# Patient Record
Sex: MALE | Race: OTHER | HISPANIC OR LATINO | Employment: FULL TIME | URBAN - METROPOLITAN AREA
[De-identification: names, ages, dates, MRNs, and addresses within clinical notes are randomized per-mention and may not be internally consistent; named-entity substitution may affect disease eponyms.]

---

## 2018-12-29 ENCOUNTER — OFFICE VISIT (OUTPATIENT)
Dept: URGENT CARE | Facility: CLINIC | Age: 48
End: 2018-12-29
Payer: COMMERCIAL

## 2018-12-29 VITALS
HEIGHT: 66 IN | RESPIRATION RATE: 16 BRPM | BODY MASS INDEX: 35.36 KG/M2 | TEMPERATURE: 97.7 F | SYSTOLIC BLOOD PRESSURE: 120 MMHG | WEIGHT: 220 LBS | OXYGEN SATURATION: 98 % | DIASTOLIC BLOOD PRESSURE: 72 MMHG | HEART RATE: 74 BPM

## 2018-12-29 DIAGNOSIS — H10.89 OTHER CONJUNCTIVITIS OF LEFT EYE: ICD-10-CM

## 2018-12-29 DIAGNOSIS — J40 BRONCHITIS: ICD-10-CM

## 2018-12-29 DIAGNOSIS — R09.81 SINUS CONGESTION: ICD-10-CM

## 2018-12-29 PROCEDURE — 99203 OFFICE O/P NEW LOW 30 MIN: CPT | Performed by: PHYSICIAN ASSISTANT

## 2018-12-29 RX ORDER — OFLOXACIN 3 MG/ML
SOLUTION/ DROPS OPHTHALMIC
Qty: 5 ML | Refills: 0 | Status: SHIPPED | OUTPATIENT
Start: 2018-12-29 | End: 2023-05-09

## 2018-12-29 RX ORDER — CEFUROXIME AXETIL 500 MG/1
500 TABLET ORAL 2 TIMES DAILY
Qty: 20 TAB | Refills: 0 | Status: SHIPPED | OUTPATIENT
Start: 2018-12-29 | End: 2019-01-08

## 2018-12-29 ASSESSMENT — ENCOUNTER SYMPTOMS
FEVER: 0
CARDIOVASCULAR NEGATIVE: 1
RHINORRHEA: 1
SORE THROAT: 0
SHORTNESS OF BREATH: 0
COUGH: 1
EYES NEGATIVE: 1
WHEEZING: 0
CONSTITUTIONAL NEGATIVE: 1

## 2018-12-29 NOTE — LETTER
December 29, 2018         Patient: Christian Boo   YOB: 1970   Date of Visit: 12/29/2018           To Whom it May Concern:    Christian Boo was seen in my clinic on 12/29/2018 for illness; please excuse today.    If you have any questions or concerns, please don't hesitate to call.        Sincerely,           Humberto Nogueira P.A.-C.  Electronically Signed

## 2018-12-29 NOTE — PROGRESS NOTES
Subjective:      Christian Boo is a 48 y.o. male who presents with Cough (cough, congestion)            Cough   This is a new problem. The current episode started in the past 7 days. The problem has been unchanged. The problem occurs every few minutes. The cough is productive of sputum. Associated symptoms include nasal congestion and rhinorrhea. Pertinent negatives include no fever, sore throat, shortness of breath or wheezing. Nothing aggravates the symptoms. He has tried nothing for the symptoms. The treatment provided no relief. There is no history of asthma.       Review of Systems   Constitutional: Negative.  Negative for fever.   HENT: Positive for rhinorrhea. Negative for sore throat.    Eyes: Negative.    Respiratory: Positive for cough. Negative for shortness of breath and wheezing.    Cardiovascular: Negative.    Skin: Negative.           Objective:     There were no vitals taken for this visit.     Physical Exam   Constitutional: He is oriented to person, place, and time. He appears well-developed and well-nourished. No distress.   HENT:   Head: Normocephalic and atraumatic.   Mouth/Throat: Oropharynx is clear and moist.   Eyes: Pupils are equal, round, and reactive to light. Conjunctivae and EOM are normal.   Neck: Normal range of motion. Neck supple.   Cardiovascular: Normal rate, regular rhythm and normal heart sounds.    Pulmonary/Chest: Effort normal and breath sounds normal. No respiratory distress. He has no wheezes. He has no rales.   Lymphadenopathy:     He has no cervical adenopathy.   Neurological: He is alert and oriented to person, place, and time.   Skin: Skin is warm and dry.   Psychiatric: He has a normal mood and affect. His behavior is normal.   Nursing note and vitals reviewed.    Active Ambulatory Problems     Diagnosis Date Noted   • No Active Ambulatory Problems     Resolved Ambulatory Problems     Diagnosis Date Noted   • No Resolved Ambulatory Problems     No Additional Past  Medical History     No current outpatient prescriptions on file prior to visit.     No current facility-administered medications on file prior to visit.      Social History     Social History   • Marital status:      Spouse name: N/A   • Number of children: N/A   • Years of education: N/A     Occupational History   • Not on file.     Social History Main Topics   • Smoking status: Never Smoker   • Smokeless tobacco: Never Used   • Alcohol use Not on file   • Drug use: Unknown   • Sexual activity: Not on file     Other Topics Concern   • Not on file     Social History Narrative   • No narrative on file     History reviewed. No pertinent family history.  Patient has no known allergies.              Assessment/Plan:     ·  bronchitis      · Start w/MucinexD; nsaids; [hold rx for abx prn [conditional use] if sx persist/worsen]  ·

## 2019-04-04 ENCOUNTER — OFFICE VISIT (OUTPATIENT)
Dept: URGENT CARE | Facility: CLINIC | Age: 49
End: 2019-04-04
Payer: COMMERCIAL

## 2019-04-04 ENCOUNTER — TELEPHONE (OUTPATIENT)
Dept: URGENT CARE | Facility: CLINIC | Age: 49
End: 2019-04-04

## 2019-04-04 VITALS
WEIGHT: 220 LBS | SYSTOLIC BLOOD PRESSURE: 134 MMHG | TEMPERATURE: 98.9 F | RESPIRATION RATE: 16 BRPM | DIASTOLIC BLOOD PRESSURE: 82 MMHG | HEART RATE: 82 BPM | BODY MASS INDEX: 35.36 KG/M2 | HEIGHT: 66 IN | OXYGEN SATURATION: 98 %

## 2019-04-04 DIAGNOSIS — J06.9 VIRAL UPPER RESPIRATORY ILLNESS: ICD-10-CM

## 2019-04-04 DIAGNOSIS — R05.9 COUGH: ICD-10-CM

## 2019-04-04 LAB
ALBUMIN SERPL-MCNC: 4.3 G/DL (ref 3.5–5.5)
ALBUMIN/GLOB SERPL: 2 {RATIO} (ref 1.2–2.2)
ALP SERPL-CCNC: 82 IU/L (ref 39–117)
ALT SERPL-CCNC: 32 IU/L (ref 0–44)
AST SERPL-CCNC: 24 IU/L (ref 0–40)
BASOPHILS # BLD AUTO: 0 X10E3/UL (ref 0–0.2)
BASOPHILS NFR BLD AUTO: 0 %
BILIRUB SERPL-MCNC: 0.5 MG/DL (ref 0–1.2)
BUN SERPL-MCNC: 19 MG/DL (ref 6–24)
BUN/CREAT SERPL: 21 (ref 9–20)
CALCIUM SERPL-MCNC: 8.9 MG/DL (ref 8.7–10.2)
CHLORIDE SERPL-SCNC: 105 MMOL/L (ref 96–106)
CO2 SERPL-SCNC: 20 MMOL/L (ref 20–29)
CREAT SERPL-MCNC: 0.92 MG/DL (ref 0.76–1.27)
EOSINOPHIL # BLD AUTO: 0.2 X10E3/UL (ref 0–0.4)
EOSINOPHIL NFR BLD AUTO: 2 %
ERYTHROCYTE [DISTWIDTH] IN BLOOD BY AUTOMATED COUNT: 13.5 % (ref 12.3–15.4)
GLOBULIN SER CALC-MCNC: 2.2 G/DL (ref 1.5–4.5)
GLUCOSE SERPL-MCNC: 104 MG/DL (ref 65–99)
HCT VFR BLD AUTO: 45.9 % (ref 37.5–51)
HGB BLD-MCNC: 15.9 G/DL (ref 13–17.7)
IMM GRANULOCYTES # BLD AUTO: NORMAL 10*3/UL
IMM GRANULOCYTES NFR BLD AUTO: NORMAL %
IMMATURE CELLS  115398: NORMAL
LYMPHOCYTES # BLD AUTO: 1.9 X10E3/UL (ref 0.7–3.1)
LYMPHOCYTES NFR BLD AUTO: 21 %
MCH RBC QN AUTO: 30.6 PG (ref 26.6–33)
MCHC RBC AUTO-ENTMCNC: 34.6 G/DL (ref 31.5–35.7)
MCV RBC AUTO: 88 FL (ref 79–97)
MONOCYTES # BLD AUTO: 0.7 X10E3/UL (ref 0.1–0.9)
MONOCYTES NFR BLD AUTO: 8 %
MORPHOLOGY BLD-IMP: NORMAL
NEUTROPHILS # BLD AUTO: 6.1 X10E3/UL (ref 1.4–7)
NEUTROPHILS NFR BLD AUTO: 69 %
NRBC BLD AUTO-RTO: NORMAL %
PLATELET # BLD AUTO: 222 X10E3/UL (ref 150–379)
POTASSIUM SERPL-SCNC: 4.1 MMOL/L (ref 3.5–5.2)
PROT SERPL-MCNC: 6.5 G/DL (ref 6–8.5)
RBC # BLD AUTO: 5.19 X10E6/UL (ref 4.14–5.8)
SODIUM SERPL-SCNC: 139 MMOL/L (ref 134–144)
WBC # BLD AUTO: 8.9 X10E3/UL (ref 3.4–10.8)

## 2019-04-04 PROCEDURE — 99214 OFFICE O/P EST MOD 30 MIN: CPT | Performed by: PHYSICIAN ASSISTANT

## 2019-04-04 ASSESSMENT — ENCOUNTER SYMPTOMS
MYALGIAS: 1
SORE THROAT: 1
FEVER: 1
ABDOMINAL PAIN: 0
EYE DISCHARGE: 0
VOMITING: 0
NAUSEA: 1
EYE REDNESS: 0
HEADACHES: 1
COUGH: 1

## 2019-04-04 NOTE — LETTER
[facility]  [address] [place] [address]     April 4, 2019    Patient: Christian Boo   YOB: 1970   Date of Visit: 4/4/2019       To Whom It May Concern:    Christian Boo was seen and treated in our department on 4/4/2019. Please excuse him from work Today (4/4) and Tomorrow (4/5).    Sincerely,     Margot Shipman

## 2019-04-04 NOTE — PROGRESS NOTES
Subjective:      Christian Boo is a 48 y.o. male who presents with Cough (shortness of breath, cough, fever and light headed, anxiety, stomach upset started over a week now)          Cough   This is a new problem. Episode onset: 1 week ago. The problem has been gradually worsening. The problem occurs hourly. The cough is productive of sputum (patient reports occasional sputum production). Associated symptoms include a fever, headaches, myalgias, nasal congestion, a sore throat and shortness of breath (intermittent). Pertinent negatives include no chest pain, ear pain, eye redness or rash. Exacerbated by: patient reports his cough is exacerbated by smoke from working in the [email or web address]. He has tried nothing for the symptoms.     The patient reports a history of seasonal allergies in the spring.    Patient is also c/o intermittent lightheadedness. He states he is feeling slightly more anxious lately. He also reports intermittent leg cramps, and swelling of his right lower extremity. The patient does not smoke, and denies a family history of blood clots or blood disorders.         PMH:  has no past medical history on file.  MEDS:   Current Outpatient Prescriptions:   •  ofloxacin (OCUFLOX) 0.3 % Solution, Place 1 gtt into affected eye[s] q3 hrs, Disp: 5 mL, Rfl: 0  ALLERGIES: No Known Allergies  SURGHX: No past surgical history on file.  SOCHX:  reports that he has never smoked. He has never used smokeless tobacco.  FH: Family history was reviewed, no pertinent findings to report      Review of Systems   Constitutional: Positive for fever.   HENT: Positive for sore throat. Negative for ear pain.    Eyes: Negative for discharge and redness.   Respiratory: Positive for cough and shortness of breath (intermittent).    Cardiovascular: Positive for leg swelling. Negative for chest pain.   Gastrointestinal: Positive for nausea. Negative for abdominal pain and vomiting.   Genitourinary: Negative for dysuria, frequency and urgency.  "  Musculoskeletal: Positive for myalgias.   Skin: Negative for rash.   Neurological: Positive for headaches.          Objective:     /82 (BP Location: Left arm, Patient Position: Sitting, BP Cuff Size: Adult)   Pulse 82   Temp 37.2 °C (98.9 °F) (Temporal)   Resp 16   Ht 1.676 m (5' 6\")   Wt 99.8 kg (220 lb)   SpO2 98%   BMI 35.51 kg/m²      Physical Exam   Constitutional: He is oriented to person, place, and time. He appears well-developed and well-nourished. No distress.   HENT:   Head: Normocephalic and atraumatic.   Right Ear: Tympanic membrane, external ear and ear canal normal.   Left Ear: Tympanic membrane, external ear and ear canal normal.   Nose: Nose normal.   Mouth/Throat: Oropharynx is clear and moist. No posterior oropharyngeal erythema.   Eyes: Conjunctivae and EOM are normal.   Neck: Normal range of motion. Neck supple.   Cardiovascular: Normal rate, regular rhythm and normal heart sounds.    Pulmonary/Chest: Effort normal and breath sounds normal.   Musculoskeletal: Normal range of motion. He exhibits no edema.   Neurological: He is alert and oriented to person, place, and time.   Skin: Skin is warm and dry.           CBC:  Component Value Ref Range & Units Status   WBC 8.9  3.4 - 10.8 x10E3/uL Final   RBC 5.19  4.14 - 5.80 x10E6/uL Final   Hemoglobin 15.9  13.0 - 17.7 g/dL Final   Hematocrit 45.9  37.5 - 51.0 % Final   MCV 88  79 - 97 fL Final   MCH 30.6  26.6 - 33.0 pg Final   MCHC 34.6  31.5 - 35.7 g/dL Final   RDW 13.5  12.3 - 15.4 % Final   Platelet Count 222  150 - 379 x10E3/uL Final   Neutrophils-Polys 69  Not Estab. % Final   Lymphocytes 21  Not Estab. % Final   Monocytes 8  Not Estab. % Final   Eosinophils 2  Not Estab. % Final   Basophils 0  Not Estab. % Final   Immature Cells CANCELED      Comment:   Result canceled by the ancillary   Neutrophils (Absolute) 6.1  1.4 - 7.0 x10E3/uL Final   Lymphs (Absolute) 1.9  0.7 - 3.1 x10E3/uL Final   Monos (Absolute) 0.7  0.1 - 0.9 " x10E3/uL Final   Eos (Absolute) 0.2  0.0 - 0.4 x10E3/uL Final   Baso (Absolute) 0.0  0.0 - 0.2 x10E3/uL Final         CMP:  Component Value Ref Range & Units Status   Glucose 104   65 - 99 mg/dL Final   Bun 19  6 - 24 mg/dL Final   Creatinine 0.92  0.76 - 1.27 mg/dL Final   GFR If Non  97  >59 mL/min/1.73 Final   GFR If African American 113  >59 mL/min/1.73 Final   Bun-Creatinine Ratio 21   9 - 20 Final   Sodium 139  134 - 144 mmol/L Final   Potassium 4.1  3.5 - 5.2 mmol/L Final   Chloride 105  96 - 106 mmol/L Final   Co2 20  20 - 29 mmol/L Final   Calcium 8.9  8.7 - 10.2 mg/dL Final   Total Protein 6.5  6.0 - 8.5 g/dL Final   Albumin 4.3  3.5 - 5.5 g/dL Final   Globulin 2.2  1.5 - 4.5 g/dL Final   A-G Ratio 2.0  1.2 - 2.2 Final   Total Bilirubin 0.5  0.0 - 1.2 mg/dL Final   Alkaline Phosphatase 82  39 - 117 IU/L Final   AST(SGOT) 24  0 - 40 IU/L Final   ALT(SGPT) 32  0 - 44 IU/L Final     D-Dimer: Negative - called the lab and received a verbal result from the [facility] lab.     Called the patient with the results of his lab tests.      Assessment/Plan:     1. Cough  2. Viral Upper Respiratory Illness      The the cause of the patient's cough and shortness of breath is unknown at this time.  His symptoms may be related to a viral illness, or seasonal allergies.  The patient's CBC, CMP and D-Dimer were negative for acute abnormalities, indicating his his symptoms are unlikely due to a DVT, PE or anemia.  Additionally, given the patient is currently not experiencing chest pain, a peresistent productive cough, or persistent shortness of breath, and the presence of a normal pulse ox and clear lung sounds on physical exam, it is unlikely his symptoms are due to an acute lower respiratory tract infection, such as pneumonia.  The patient also admits he is feeling slightly more anxious, therefore his symptoms of shortness of breath and lightheadedness may be due to anxiety.  Recommend OTC symptomatic  management of the patient's symptoms.  Discussed strict return precautions with the patient, and he verbalized understanding.  Plan:  OTC cough/cold medication for symptomatic relief, such as Mucinex  OTC allergy medication, such as Claritin  OTC Tylenol or Motrin for fever/discomfort.  Drink plenty of fluids  Work note provided  Follow-up with PCP  Return to clinic or go to the ED if symptoms worsen or fail to improve, or if patient should develop worsening/increasing cough, worsening/increasing/persistent shortness of breath, persistent leg swelling, persistent leg cramping/pain, worsening/increasing lightheadedness, and/or fevers/chills.    Discussed plan with the patient, and he agrees to the above.

## 2019-04-05 LAB — D DIMER PPP FEU-MCNC: NORMAL

## 2019-04-05 ASSESSMENT — ENCOUNTER SYMPTOMS: SHORTNESS OF BREATH: 1

## 2019-05-14 ENCOUNTER — OFFICE VISIT (OUTPATIENT)
Dept: URGENT CARE | Facility: CLINIC | Age: 49
End: 2019-05-14
Payer: COMMERCIAL

## 2019-05-14 ENCOUNTER — APPOINTMENT (OUTPATIENT)
Dept: RADIOLOGY | Facility: IMAGING CENTER | Age: 49
End: 2019-05-14
Attending: PHYSICIAN ASSISTANT
Payer: COMMERCIAL

## 2019-05-14 VITALS
TEMPERATURE: 97.9 F | DIASTOLIC BLOOD PRESSURE: 74 MMHG | BODY MASS INDEX: 33.34 KG/M2 | SYSTOLIC BLOOD PRESSURE: 106 MMHG | HEIGHT: 68 IN | RESPIRATION RATE: 18 BRPM | WEIGHT: 220 LBS | HEART RATE: 82 BPM | OXYGEN SATURATION: 96 %

## 2019-05-14 DIAGNOSIS — M25.561 ACUTE PAIN OF RIGHT KNEE: ICD-10-CM

## 2019-05-14 PROCEDURE — 99214 OFFICE O/P EST MOD 30 MIN: CPT | Performed by: PHYSICIAN ASSISTANT

## 2019-05-14 PROCEDURE — 73564 X-RAY EXAM KNEE 4 OR MORE: CPT | Mod: TC,RT | Performed by: PHYSICIAN ASSISTANT

## 2019-05-14 ASSESSMENT — ENCOUNTER SYMPTOMS
NUMBNESS: 0
NEUROLOGICAL NEGATIVE: 1
FALLS: 0
CHILLS: 0
CONSTITUTIONAL NEGATIVE: 1
WEAKNESS: 0
RESPIRATORY NEGATIVE: 1
ARTHRALGIAS: 0
FEVER: 0
CARDIOVASCULAR NEGATIVE: 1
JOINT SWELLING: 1

## 2019-05-14 NOTE — LETTER
May 14, 2019         Patient: Christian Boo   YOB: 1970   Date of Visit: 5/14/2019           To Whom it May Concern:    Christian Boo was seen in my clinic on 5/14/2019. He may return to work on Weds. May 15th.    If you have any questions or concerns, please don't hesitate to call.        Sincerely,           Ramon Colvin P.A.-C.  Electronically Signed

## 2019-05-14 NOTE — PROGRESS NOTES
"Subjective:      Christian Boo is a 48 y.o. male who presents with Knee Pain (painful to walk (R) knee-x 1.5 -Surgery 2007-twisted it a month ago)            Previous right knee scope in 2007.      Knee Pain   This is a new problem. The current episode started more than 1 month ago. The problem occurs constantly. The problem has been gradually worsening. Associated symptoms include joint swelling. Pertinent negatives include no arthralgias, chills, fever, numbness or weakness. The symptoms are aggravated by walking and standing. He has tried heat, NSAIDs, ice and immobilization for the symptoms. The treatment provided mild relief.       PMH:  has no past medical history on file.  MEDS:   Current Outpatient Prescriptions:   •  ofloxacin (OCUFLOX) 0.3 % Solution, Place 1 gtt into affected eye[s] q3 hrs, Disp: 5 mL, Rfl: 0  ALLERGIES: No Known Allergies  SURGHX: No past surgical history on file.  SOCHX:  reports that he has never smoked. He has never used smokeless tobacco.  FH: family history is not on file.      Review of Systems   Constitutional: Negative.  Negative for chills and fever.   Respiratory: Negative.    Cardiovascular: Negative.    Musculoskeletal: Positive for joint pain and joint swelling. Negative for arthralgias and falls.   Neurological: Negative.  Negative for weakness and numbness.     Medications, Allergies, and current problem list reviewed today in Epic     Objective:     /74 (BP Location: Left arm, Patient Position: Sitting)   Pulse 82   Temp 36.6 °C (97.9 °F) (Temporal)   Resp 18   Ht 1.727 m (5' 8\")   Wt 99.8 kg (220 lb)   SpO2 96%   BMI 33.45 kg/m²      Physical Exam   Constitutional: He is oriented to person, place, and time. He appears well-developed and well-nourished. No distress.   HENT:   Head: Normocephalic and atraumatic.   Eyes: Conjunctivae and EOM are normal.   Neck: Normal range of motion. Neck supple.   Cardiovascular: Normal rate, regular rhythm and normal heart " sounds.    No murmur heard.  Pulmonary/Chest: Effort normal and breath sounds normal. No respiratory distress. He has no wheezes.   Musculoskeletal:        Right knee: He exhibits decreased range of motion and swelling. He exhibits no effusion and no ecchymosis. Tenderness found.   Neurological: He is alert and oriented to person, place, and time.   Skin: Skin is warm and dry. He is not diaphoretic.   Psychiatric: He has a normal mood and affect. His behavior is normal. Judgment and thought content normal.   Nursing note and vitals reviewed.              Assessment/Plan:     1. Acute pain of right knee  DX-KNEE COMPLETE 4+ RIGHT    REFERRAL TO SPORTS MEDICINE     Xray: no fracture or dislocation by my read. Radiology review pending.    OTC meds and conservative measures as discussed  Return to clinic or go to ED if symptoms worsen or persist. Indications for ED discussed at length. Patient voices understanding. Follow-up with your primary care provider in 3-5 days. Red flags discussed. All side effects of medication discussed including allergic response, GI upset, tendon injury, etc.    Please note that this dictation was created using voice recognition software. I have made every reasonable attempt to correct obvious errors, but I expect that there are errors of grammar and possibly content that I did not discover before finalizing the note.

## 2019-06-26 ENCOUNTER — TELEPHONE (OUTPATIENT)
Dept: SCHEDULING | Facility: IMAGING CENTER | Age: 49
End: 2019-06-26

## 2019-06-29 ENCOUNTER — OFFICE VISIT (OUTPATIENT)
Dept: URGENT CARE | Facility: CLINIC | Age: 49
End: 2019-06-29
Payer: COMMERCIAL

## 2019-06-29 VITALS
OXYGEN SATURATION: 98 % | WEIGHT: 226 LBS | HEART RATE: 77 BPM | RESPIRATION RATE: 16 BRPM | DIASTOLIC BLOOD PRESSURE: 82 MMHG | TEMPERATURE: 98.2 F | HEIGHT: 68 IN | SYSTOLIC BLOOD PRESSURE: 118 MMHG | BODY MASS INDEX: 34.25 KG/M2

## 2019-06-29 DIAGNOSIS — M25.561 RIGHT KNEE PAIN, UNSPECIFIED CHRONICITY: ICD-10-CM

## 2019-06-29 PROCEDURE — 99214 OFFICE O/P EST MOD 30 MIN: CPT | Performed by: NURSE PRACTITIONER

## 2019-06-29 RX ORDER — TRAMADOL HYDROCHLORIDE 50 MG/1
50 TABLET ORAL EVERY 6 HOURS PRN
Qty: 12 TAB | Refills: 0 | Status: SHIPPED | OUTPATIENT
Start: 2019-06-29 | End: 2019-07-04

## 2019-06-29 NOTE — PROGRESS NOTES
"Chief Complaint   Patient presents with   • Knee Pain     x 2 months, Rt. knee pain, pain is getting worse  \"appt. with Sports Med on Monday\"       HISTORY OF PRESENT ILLNESS: Patient is a 48 y.o. male who presents to urgent care today with complaints of right knee pain for the past two months. He twisted his knee at onset, felt pain to lateral aspect and has had pain since. Pain is exacerbated with walking, sitting to standing. Pain radiates to upper and lower leg. He denies weakness, fever, chills. He has been using a brace, using motrin, Tylenol and elevating.  He was seen for the same approximately 1 month ago, x-rays were performed, negative.  He reports worsening.  He has an appointment to see sports medicine on Monday.      There are no active problems to display for this patient.      Allergies:Patient has no known allergies.    Current Outpatient Prescriptions Ordered in [employer]   Medication Sig Dispense Refill   • ofloxacin (OCUFLOX) 0.3 % Solution Place 1 gtt into affected eye[s] q3 hrs (Patient not taking: Reported on 6/29/2019) 5 mL 0     No current Epic-ordered facility-administered medications on file.        History reviewed. No pertinent past medical history.    Social History   Substance Use Topics   • Smoking status: Never Smoker   • Smokeless tobacco: Never Used   • Alcohol use Not on file       No family status information on file.   History reviewed. No pertinent family history.    ROS:  Review of Systems   Constitutional: Negative for fever, chills, weight loss, malaise, and fatigue.   HENT: Negative for ear pain, nosebleeds, congestion, sore throat and neck pain.    Eyes: Negative for vision changes.   Neuro: Negative for headache, sensory changes, weakness, seizure, LOC.   Cardiovascular: Negative for chest pain, palpitations, orthopnea and leg swelling.   Respiratory: Negative for cough, sputum production, shortness of breath and wheezing.   Gastrointestinal: Negative for abdominal pain, " "nausea, vomiting or diarrhea.   Genitourinary: Negative for dysuria, urgency and frequency.  Musculoskeletal: Positive for joint pain.  Negative for falls, neck pain, back pain, myalgias.   Skin: Negative for rash, diaphoresis.     Exam:  /82 (BP Location: Left arm, Patient Position: Sitting, BP Cuff Size: Large adult)   Pulse 77   Temp 36.8 °C (98.2 °F) (Temporal)   Resp 16   Ht 1.727 m (5' 8\")   Wt 102.5 kg (226 lb)   SpO2 98%   General: well-nourished, well-developed male in NAD  Head: normocephalic, atraumatic  Eyes: PERRLA, no conjunctival injection, acuity grossly intact, lids normal.  Ears: normal shape and symmetry, no tenderness, no discharge. External canals are without any significant edema or erythema. Gross auditory acuity is intact.  Nose: symmetrical without tenderness, no discharge.  Mouth/Throat: reasonable hygiene, no erythema, exudates or tonsillar enlargement.  Neck: no masses, range of motion within normal limits, no tracheal deviation. No obvious thyroid enlargement.   Lymph: no cervical adenopathy. No supraclavicular adenopathy.   Neuro: alert and oriented. Cranial nerves 1-12 grossly intact. No sensory deficit.   Cardiovascular: regular rate and rhythm. No edema.  Pulmonary: no distress. Chest is symmetrical with respiration, no wheezes, crackles, or rhonchi.   Musculoskeletal: no clubbing, appropriate muscle tone, gait is antalgic.  Right knee is mild to moderately swollen compared to left knee, generalized soft tissue to bilateral aspects of knee, tenderness over patella as well.  Knee has full extension without difficulty.  Pain is elicited with flexion.  No obvious laxity is noted.  No erythema.  Skin: warm, dry, intact, no clubbing, no cyanosis, no rashes.   Psych: appropriate mood, affect, judgement.         Assessment/Plan:  1. Right knee pain, unspecified chronicity  tramadol (ULTRAM) 50 MG Tab    Consent for Opiate Prescription         Previous clinic visit encounter " reviewed and considered in medical decision making today, x-ray negative.  Continue home care as previously directed.  Patient will be given a short course of tramadol due to worsening pain.  Follow-up with sports medicine on Monday as planned. Consent obtained. [facility] and the [facility] Aware web site evaluation: I have obtained and reviewed patient utilization report from [facility] pharmacy database prior to writing prescription for controlled substance II, III or IV per [place] bill . Based on the report and my clinical assessment the prescription is medically necessary.   Supportive care, differential diagnoses, and indications for immediate follow-up discussed with patient.   Pathogenesis of diagnosis discussed including typical length and natural progression.   Instructed to return to clinic or nearest emergency department for any change in condition, further concerns, or worsening of symptoms.  Patient states understanding of the plan of care and discharge instructions.          Please note that this dictation was created using voice recognition software. I have made every reasonable attempt to correct obvious errors, but I expect that there are errors of grammar and possibly content that I did not discover before finalizing the note.      KIM Anthony.

## 2019-07-01 ENCOUNTER — OFFICE VISIT (OUTPATIENT)
Dept: MEDICAL GROUP | Facility: CLINIC | Age: 49
End: 2019-07-01
Payer: COMMERCIAL

## 2019-07-01 VITALS
HEART RATE: 90 BPM | OXYGEN SATURATION: 97 % | BODY MASS INDEX: 34.25 KG/M2 | TEMPERATURE: 98.7 F | RESPIRATION RATE: 18 BRPM | HEIGHT: 68 IN | SYSTOLIC BLOOD PRESSURE: 122 MMHG | WEIGHT: 226 LBS | DIASTOLIC BLOOD PRESSURE: 80 MMHG

## 2019-07-01 DIAGNOSIS — M54.16 LUMBAR RADICULOPATHY, RIGHT: ICD-10-CM

## 2019-07-01 DIAGNOSIS — M23.91 INTERNAL DERANGEMENT OF KNEE, RIGHT: ICD-10-CM

## 2019-07-01 PROCEDURE — 99214 OFFICE O/P EST MOD 30 MIN: CPT | Performed by: FAMILY MEDICINE

## 2019-07-02 NOTE — PROGRESS NOTES
"CHIEF COMPLAINT:  Christian Boo male presenting at the request of Ramon Colvin PA-C for evaluation of knee pain.     Christian Boo is complaining of right knee pain  present for several weeks.  Pain is at the anteromedial knee  Quality is sharp and constant  Pain is radiating to RIGHT ankle and RIGHT thigh/hip   Improved with heat, rest and elevation as well as using knee brace when he knows he is going to have to walk for long periods of time  Aggravated by walking  previous knee injury, with POSITIVE prior arthroscopy for meniscectomy and \"loss of cartilage\"   Prior Treatments: Physical Therapy, last session of therapy was when he had his knee surgery with back in 2008  Prior studies: X-Ray   Medications tried for pain include: Tramadol  Mechanical Symptom history: POSITIVE Locking and Stiffness   POSITIVE history of \"several locking episodes\", he describes one where he was walking down the stairs and the knee locked up causing him to practically fall down the stairs and having to hold onto the railing    Some occasional lumbar spine pain  He did have an episode where he was sitting steps for long period of time, went to get up and felt some numbness and tingling in the RIGHT leg  After that, he started noticing pain up and down the entire leg  With radiation all the way to the great toe side as well as lateral foot and along the fifth toe    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 No pertinent past medical history.    PMH:  has no past medical history on file.  MEDS:   Current Outpatient Prescriptions:   •  tramadol (ULTRAM) 50 MG Tab, Take 1 Tab by mouth every 6 hours as needed for Moderate Pain or Severe Pain for up to 5 days., Disp: 12 Tab, Rfl: 0  •  ofloxacin (OCUFLOX) 0.3 % Solution, Place 1 gtt into affected eye[s] q3 hrs " "(Patient not taking: Reported on 6/29/2019), Disp: 5 mL, Rfl: 0  ALLERGIES: No Known Allergies  SURGHX: No past surgical history on file.  SOCHX:  reports that he has never smoked. He has never used smokeless tobacco.  FH: Family history was reviewed, no pertinent findings to report     PHYSICAL EXAM:  /80 (BP Location: Left arm, Patient Position: Sitting, BP Cuff Size: Adult)   Pulse 90   Temp 37.1 °C (98.7 °F) (Temporal)   Resp 18   Ht 1.727 m (5' 8\")   Wt 102.5 kg (226 lb)   SpO2 97%   BMI 34.36 kg/m²      obese in no apparent distress, alert and oriented x 3.  Gait: antalgic     RIGHT Knee:  Slight Varus and Swelling   Range of Motion Slightly limited with Flexion and Pain at extremes of motion  2+ effusion  Patellar Medial facet tenderness, Lateral facet tenderness, Apprehension and POSITIVE crepitus  Medial Joint Line Tenderness and POSITIVE Roger  Lateral Joint Line Non-tender and NEGATIVE Roger  Trace Laxity with Varus stress  Trace Laxity with Valgus stress  Lachman's testing is Trace  Posterior Drawer Testing is Trace  The leg is otherwise neurovascularly intact    LEFT Knee:  Slight Varus and No Swelling   Range of Motion Intact  Trace effusion  Patellar No tenderness and no apprehension  Medial Joint Line Non-tender and NEGATIVE Roger  Lateral Joint Line Non-tender and NEGATIVE Roger  Trace Laxity with Varus stress  Trace Laxity with Valgus stress  Lachman's testing is Trace  Posterior Drawer Testing is Trace  The leg is otherwise neurovascularly intact    Lumbar spine exam:  No acute distress  Able to walk on heels and toes  Able to flex to 90° with SOME discomfort  Extension and lateral rotation with SOME discomfort  Strength testing with hip flexion, knee flexion and extension, ankle dorsiflexion and plantarflexion, and EHL testing were 5 out of 5 bilaterally  Sensation was intact bilaterally  The legs were otherwise neurovascularly intact  POSITIVE slump test on the RIGHT " "compared to left    1. Internal derangement of knee, right  MR-KNEE-W/O RIGHT   2. Lumbar radiculopathy, right  REFERRAL TO PHYSICAL THERAPY Reason for Therapy: Eval/Treat/Report     POSITIVE history of \"several locking episodes\", which sound like true mechanical locking   one where he was walking down the stairs and the knee locked up causing him to practically fall down the stairs he has had upwards around 15 of these episodes, lasting approximately 3 to 4 minutes and he has to wiggle the need to free the knee again  Check MRI of the RIGHT knee  Patient was fitted with a hinged knee brace in the office TODAY (July 1, 2019)    Regarding his RIGHT lumbar radiculopathy, he does have some reproducible findings on examination  Fortunately, strength is intact  Slump test is POSITIVE with radicular symptoms down the RIGHT leg  For now, we will do some formal physical therapy and he will be provided with some home exercises to do on his own while he gets set up with therapy  Should his symptoms worsen, if he develops weakness or should he not tolerate therapy due to worsening of his pain we can consider MRI of the lumbar spine at that time    Return in about 1 week (around 7/8/2019).  To discuss MRI results of the RIGHT knee for his mechanical symptoms              5/14/2019 10:44 AM    HISTORY/REASON FOR EXAM:  Atraumatic right medial knee pain for 2 months.      TECHNIQUE/EXAM DESCRIPTION AND NUMBER OF VIEWS:  4 views of the RIGHT knee.    COMPARISON: None    FINDINGS:    The alignment of the knee is within normal limits.  There is no definite  joint effusion.  There is no evidence of displaced fracture or dislocation.  There is mild degenerative change of the lateral aspect of the patellofemoral articulation.  There is no focal swelling.     Impression       1.  No right knee fracture or dislocation.    2.  Mild degenerative change of the lateral aspect of the patellofemoral articulation.     done elsewhere and " reviewed independently by me    Thank you Ramon Colvin PA-C for allowing me to participate in care for your patient.

## 2019-07-18 ENCOUNTER — OFFICE VISIT (OUTPATIENT)
Dept: MEDICAL GROUP | Facility: CLINIC | Age: 49
End: 2019-07-18
Payer: COMMERCIAL

## 2019-07-18 VITALS — BODY MASS INDEX: 34.25 KG/M2 | WEIGHT: 226 LBS | HEIGHT: 68 IN

## 2019-07-18 DIAGNOSIS — M23.006 MENISCAL CYST, RIGHT: ICD-10-CM

## 2019-07-18 DIAGNOSIS — M17.11 PRIMARY OSTEOARTHRITIS OF RIGHT KNEE: ICD-10-CM

## 2019-07-18 PROCEDURE — 99213 OFFICE O/P EST LOW 20 MIN: CPT | Performed by: FAMILY MEDICINE

## 2019-07-18 NOTE — PROGRESS NOTES
1. Meniscal cyst, right (MEDIAL)     2. Primary osteoarthritis of right knee       Fortunately, there is no yomaira tear of the meniscus or obvious reasons for mechanical symptoms  Since he has an effusion with a para meniscal cyst, we have decided to proceed with aspiration/corticosteroid injection of the RIGHT knee under ultrasound guidance    We will have him scheduled for RIGHT knee aspiration/corticosteroid injection under ultrasound guidance                  Patient Name: Christian Boo   Patient Medical Record Number: [id number]   YOB: 1970   Exam Date: 07/15/2019   Referring Physician: Hi Garces MD   Referring NPI: [id number]   Accession: [id number]   Exam Description: MR-Knee without contrast Right - Right   Equipment: Siemens Aera 1.5T MRI   Exam location: [address] [place] [address]     Clinical Indication:  M23.91 Unspecified internal derangement of right knee.     Technique:  Multiple acquisition parameters were utilized to evaluate the right knee on the Siemens Aera 1.5T MRI. No IV contrast was administered.     Comparison:  None.     Findings:  Ligaments and tendons: Anterior and posterior cruciate ligaments, quadriceps and patellar tendons, medial and lateral patellofemoral ligaments, structures of the posterolateral corner, and medial and lateral collateral ligaments are preserved.   Medial compartment: Partial thickness chondromalacia along the posterior weight-bearing surface of the medial femoral condyle. Mild subchondral marrow edema along the medial tibial plateau. There is a subtle small tear through the outer third of the body of the medial meniscus contacting the inferior articular surface (sagittal series 9, image 11 and coronal series 7, image 18). There is an adjacent 4 mm parameniscal cyst.   Lateral compartment: The articular cartilage is intact. There is no marrow edema. The lateral meniscus demonstrates no evidence of tear.   Patellofemoral joint: There is  significant lateral patellar tilt and lateral patellar subluxation. 6 mm focus of full-thickness chondromalacia along the medial femoral trochlea (axial series 5, image 12). 8 mm focus of full-thickness chondromalacia along the lateral facet of the patella with underlying marrow edema (axial series 5, image 9). Moderate chondromalacia along the lateral femoral trochlea. There is a large joint effusion. No loose bodies are identified.   Surrounding structures: No cystic or solid masses. Mild prepatellar edema.     Impression:  1. Lateral patellar tilt and lateral patellar subluxation with severe chondromalacia patella. Findings suggest underlying patellar maltracking abnormality.   2. Mild chondromalacia of the medial compartment.   3. Possible subtle tear through the outer third of the body of the medial meniscus with associated parameniscal cyst.   4. Large joint effusion.   If you have any questions regarding this report, please contact Dr. Zapata at [phone].   Now offering X-ray and Ultrasound services in [place]! Please call [phone] to schedule.     Electronically Signed  By: Jasiel Zapata MD       Interpreted in the office today with the patient

## 2019-07-22 ENCOUNTER — OFFICE VISIT (OUTPATIENT)
Dept: MEDICAL GROUP | Facility: CLINIC | Age: 49
End: 2019-07-22
Payer: COMMERCIAL

## 2019-07-22 VITALS — WEIGHT: 226 LBS | BODY MASS INDEX: 34.25 KG/M2 | HEIGHT: 68 IN

## 2019-07-22 DIAGNOSIS — M23.006 MENISCAL CYST, RIGHT: ICD-10-CM

## 2019-07-22 DIAGNOSIS — M17.11 PRIMARY OSTEOARTHRITIS OF RIGHT KNEE: ICD-10-CM

## 2019-07-22 PROCEDURE — 20610 DRAIN/INJ JOINT/BURSA W/O US: CPT | Performed by: FAMILY MEDICINE

## 2019-07-22 RX ORDER — TRIAMCINOLONE ACETONIDE 40 MG/ML
40 INJECTION, SUSPENSION INTRA-ARTICULAR; INTRAMUSCULAR ONCE
Status: COMPLETED | OUTPATIENT
Start: 2019-07-22 | End: 2019-07-22

## 2019-07-22 RX ADMIN — TRIAMCINOLONE ACETONIDE 40 MG: 40 INJECTION, SUSPENSION INTRA-ARTICULAR; INTRAMUSCULAR at 12:18

## 2019-07-22 NOTE — PROGRESS NOTES
1. Primary osteoarthritis of right knee  triamcinolone acetonide (KENALOG-40) injection 40 mg   2. Meniscal cyst, right (MEDIAL)         RIGHT intra-articular knee aspiration/corticosteroid injection performed in the office TODAY (July 22, 2019)    Return in about 4 weeks (around 8/19/2019).  To see how he is doing after his RIGHT knee aspiration/intra-articular corticosteroid injection

## 2019-08-16 ENCOUNTER — NON-PROVIDER VISIT (OUTPATIENT)
Dept: URGENT CARE | Facility: CLINIC | Age: 49
End: 2019-08-16

## 2019-08-16 DIAGNOSIS — Z02.1 PRE-EMPLOYMENT DRUG SCREENING: ICD-10-CM

## 2019-08-16 LAB
AMP AMPHETAMINE: NORMAL
COC COCAINE: NORMAL
INT CON NEG: NORMAL
INT CON POS: NORMAL
MET METHAMPHETAMINES: NORMAL
OPI OPIATES: NORMAL
PCP PHENCYCLIDINE: NORMAL
POC DRUG COMMENT 753798-OCCUPATIONAL HEALTH: NEGATIVE
THC: NORMAL

## 2019-08-16 PROCEDURE — 80305 DRUG TEST PRSMV DIR OPT OBS: CPT | Performed by: NURSE PRACTITIONER

## 2020-06-18 ENCOUNTER — APPOINTMENT (OUTPATIENT)
Dept: RADIOLOGY | Facility: IMAGING CENTER | Age: 50
End: 2020-06-18
Attending: NURSE PRACTITIONER
Payer: COMMERCIAL

## 2020-06-18 ENCOUNTER — OFFICE VISIT (OUTPATIENT)
Dept: URGENT CARE | Facility: CLINIC | Age: 50
End: 2020-06-18
Payer: COMMERCIAL

## 2020-06-18 VITALS
RESPIRATION RATE: 14 BRPM | SYSTOLIC BLOOD PRESSURE: 112 MMHG | OXYGEN SATURATION: 96 % | BODY MASS INDEX: 34.4 KG/M2 | HEIGHT: 68 IN | WEIGHT: 227 LBS | DIASTOLIC BLOOD PRESSURE: 82 MMHG | TEMPERATURE: 97.5 F | HEART RATE: 95 BPM

## 2020-06-18 DIAGNOSIS — M25.562 ACUTE PAIN OF LEFT KNEE: ICD-10-CM

## 2020-06-18 PROCEDURE — 99214 OFFICE O/P EST MOD 30 MIN: CPT | Performed by: NURSE PRACTITIONER

## 2020-06-18 PROCEDURE — 73564 X-RAY EXAM KNEE 4 OR MORE: CPT | Mod: TC,LT | Performed by: NURSE PRACTITIONER

## 2020-06-18 RX ORDER — MELOXICAM 7.5 MG/1
7.5 TABLET ORAL
Qty: 30 TAB | Refills: 0 | Status: SHIPPED | OUTPATIENT
Start: 2020-06-18 | End: 2023-05-09

## 2020-06-18 ASSESSMENT — FIBROSIS 4 INDEX: FIB4 SCORE: .955549704306145303

## 2020-06-18 NOTE — LETTER
June 18, 2020         Patient: Christian Boo   YOB: 1970   Date of Visit: 6/18/2020           To Whom it May Concern:    Christian Boo was seen in my clinic on 6/18/2020. He may be excused from work for his next three scheduled shifts.     If you have any questions or concerns, please don't hesitate to call.        Sincerely,           KIM Anthony.  Electronically Signed

## 2020-06-19 NOTE — PROGRESS NOTES
Chief Complaint   Patient presents with   • Knee Pain         HISTORY OF PRESENT ILLNESS: Patient is a 49 y.o. male who presents to urgent care today with complaints of left knee pain.  The patient notes generalized left knee pain and stiffness over the past month, atraumatic.  Reports worsening over the past 2 days.  The pain is felt both at rest and with ambulation.  Admits to associated stiffness.  Denies any significant injury or trauma.  He has tried Tylenol and Epson salt soaks for the pain.    There are no active problems to display for this patient.      Allergies:Patient has no known allergies.    Current Outpatient Medications Ordered in Epic   Medication Sig Dispense Refill   • ofloxacin (OCUFLOX) 0.3 % Solution Place 1 gtt into affected eye[s] q3 hrs (Patient not taking: Reported on 6/29/2019) 5 mL 0     No current Epic-ordered facility-administered medications on file.        History reviewed. No pertinent past medical history.    Social History     Tobacco Use   • Smoking status: Never Smoker   • Smokeless tobacco: Never Used   Substance Use Topics   • Alcohol use: Yes     Comment: occ   • Drug use: Not Currently       No family status information on file.   History reviewed. No pertinent family history.    ROS:  Review of Systems   Constitutional: Negative for fever, chills, weight loss, malaise, and fatigue.   HENT: Negative for ear pain, nosebleeds, congestion, sore throat and neck pain.    Eyes: Negative for vision changes.   Neuro: Negative for headache, sensory changes, weakness, seizure, LOC.   Cardiovascular: Negative for chest pain, palpitations, orthopnea and leg swelling.   Respiratory: Negative for cough, sputum production, shortness of breath and wheezing.   Gastrointestinal: Negative for abdominal pain, nausea, vomiting or diarrhea.   Genitourinary: Negative for dysuria, urgency and frequency.  Musculoskeletal: Positive for left knee pain, stiffness, and swelling.  Negative for falls,  "neck pain, back pain, myalgias.   Skin: Negative for rash, diaphoresis.     Exam:  /82 (BP Location: Right arm, Patient Position: Sitting, BP Cuff Size: Adult)   Pulse 95   Temp 36.4 °C (97.5 °F)   Resp 14   Ht 1.727 m (5' 8\")   Wt 103 kg (227 lb)   SpO2 96%   General: well-nourished, well-developed male in NAD  Head: normocephalic, atraumatic  Eyes: PERRLA, no conjunctival injection, acuity grossly intact, lids normal.  Ears: normal shape and symmetry, no tenderness, no discharge. External canals are without any significant edema or erythema. Tympanic membranes are without any inflammation, no effusion. Gross auditory acuity is intact.  Nose: symmetrical without tenderness, no discharge.  Mouth/Throat: reasonable hygiene, no erythema, exudates or tonsillar enlargement.  Neck: no masses, range of motion within normal limits, no tracheal deviation. No obvious thyroid enlargement.   Lymph: no cervical adenopathy. No supraclavicular adenopathy.   Neuro: alert and oriented. Cranial nerves 1-12 grossly intact. No sensory deficit.   Cardiovascular: regular rate and rhythm. No edema.  Pulmonary: no distress. Chest is symmetrical with respiration, no wheezes, crackles, or rhonchi.   Musculoskeletal: no clubbing, appropriate muscle tone, gait is antalgic.  Left knee: Generalized mild swelling throughout, limited range of motion secondary to pain, tenderness to lateral medial aspects, distal neurovascular is intact.  Skin: warm, dry, intact, no clubbing, no cyanosis, no rashes.   Psych: appropriate mood, affect, judgement.       DX left knee radiology reading \"1.  Mild osteoarthritis, primarily involving patellofemoral articulation.  2.  No fracture or dislocation of LEFT knee.\"    Assessment/Plan:  1. Acute pain of left knee  DX-KNEE COMPLETE 4+ LEFT    REFERRAL TO ORTHOPEDICS    meloxicam (MOBIC) 7.5 MG Tab         X-rays negative for any acute process, suspect soft tissue etiology.  The patient is placed in a " brace, a referral to orthopedics placed.  RICE.  Mobic as needed, take with food.  Supportive care, differential diagnoses, and indications for immediate follow-up discussed with patient.   Pathogenesis of diagnosis discussed including typical length and natural progression.   Instructed to return to clinic or nearest emergency department for any change in condition, further concerns, or worsening of symptoms.  Patient states understanding of the plan of care and discharge instructions.  Instructed to make an appointment, for follow up, with his primary care provider.        Please note that this dictation was created using voice recognition software. I have made every reasonable attempt to correct obvious errors, but I expect that there are errors of grammar and possibly content that I did not discover before finalizing the note.      KIM Anthony.

## 2022-06-25 ENCOUNTER — HOSPITAL ENCOUNTER (OUTPATIENT)
Dept: LAB | Facility: MEDICAL CENTER | Age: 52
End: 2022-06-25
Attending: NURSE PRACTITIONER
Payer: COMMERCIAL

## 2022-06-25 LAB
25(OH)D3 SERPL-MCNC: 13 NG/ML (ref 30–100)
ALBUMIN SERPL BCP-MCNC: 4.4 G/DL (ref 3.2–4.9)
ALBUMIN/GLOB SERPL: 1.5 G/DL
ALP SERPL-CCNC: 69 U/L (ref 30–99)
ALT SERPL-CCNC: 24 U/L (ref 2–50)
ANION GAP SERPL CALC-SCNC: 13 MMOL/L (ref 7–16)
AST SERPL-CCNC: 21 U/L (ref 12–45)
BASOPHILS # BLD AUTO: 1 % (ref 0–1.8)
BASOPHILS # BLD: 0.07 K/UL (ref 0–0.12)
BILIRUB SERPL-MCNC: 0.8 MG/DL (ref 0.1–1.5)
BUN SERPL-MCNC: 12 MG/DL (ref 8–22)
CALCIUM SERPL-MCNC: 9 MG/DL (ref 8.5–10.5)
CHLORIDE SERPL-SCNC: 106 MMOL/L (ref 96–112)
CO2 SERPL-SCNC: 22 MMOL/L (ref 20–33)
CREAT SERPL-MCNC: 0.92 MG/DL (ref 0.5–1.4)
EOSINOPHIL # BLD AUTO: 0.13 K/UL (ref 0–0.51)
EOSINOPHIL NFR BLD: 1.8 % (ref 0–6.9)
ERYTHROCYTE [DISTWIDTH] IN BLOOD BY AUTOMATED COUNT: 40.7 FL (ref 35.9–50)
EST. AVERAGE GLUCOSE BLD GHB EST-MCNC: 114 MG/DL
FASTING STATUS PATIENT QL REPORTED: NORMAL
GFR SERPLBLD CREATININE-BSD FMLA CKD-EPI: 99 ML/MIN/1.73 M 2
GLOBULIN SER CALC-MCNC: 2.9 G/DL (ref 1.9–3.5)
GLUCOSE SERPL-MCNC: 87 MG/DL (ref 65–99)
HBA1C MFR BLD: 5.6 % (ref 4–5.6)
HCT VFR BLD AUTO: 48.6 % (ref 42–52)
HGB BLD-MCNC: 16.6 G/DL (ref 14–18)
IMM GRANULOCYTES # BLD AUTO: 0.04 K/UL (ref 0–0.11)
IMM GRANULOCYTES NFR BLD AUTO: 0.6 % (ref 0–0.9)
LYMPHOCYTES # BLD AUTO: 2.18 K/UL (ref 1–4.8)
LYMPHOCYTES NFR BLD: 30.8 % (ref 22–41)
MCH RBC QN AUTO: 30.1 PG (ref 27–33)
MCHC RBC AUTO-ENTMCNC: 34.2 G/DL (ref 33.7–35.3)
MCV RBC AUTO: 88 FL (ref 81.4–97.8)
MONOCYTES # BLD AUTO: 0.59 K/UL (ref 0–0.85)
MONOCYTES NFR BLD AUTO: 8.3 % (ref 0–13.4)
NEUTROPHILS # BLD AUTO: 4.07 K/UL (ref 1.82–7.42)
NEUTROPHILS NFR BLD: 57.5 % (ref 44–72)
NRBC # BLD AUTO: 0 K/UL
NRBC BLD-RTO: 0 /100 WBC
PLATELET # BLD AUTO: 195 K/UL (ref 164–446)
PMV BLD AUTO: 11.9 FL (ref 9–12.9)
POTASSIUM SERPL-SCNC: 3.8 MMOL/L (ref 3.6–5.5)
PROT SERPL-MCNC: 7.3 G/DL (ref 6–8.2)
PSA SERPL-MCNC: 2.33 NG/ML (ref 0–4)
RBC # BLD AUTO: 5.52 M/UL (ref 4.7–6.1)
SODIUM SERPL-SCNC: 141 MMOL/L (ref 135–145)
T4 FREE SERPL-MCNC: 1.13 NG/DL (ref 0.93–1.7)
TSH SERPL DL<=0.005 MIU/L-ACNC: 1.45 UIU/ML (ref 0.38–5.33)
WBC # BLD AUTO: 7.1 K/UL (ref 4.8–10.8)

## 2022-06-25 PROCEDURE — 82306 VITAMIN D 25 HYDROXY: CPT

## 2022-06-25 PROCEDURE — 83036 HEMOGLOBIN GLYCOSYLATED A1C: CPT

## 2022-06-25 PROCEDURE — 84443 ASSAY THYROID STIM HORMONE: CPT

## 2022-06-25 PROCEDURE — 85025 COMPLETE CBC W/AUTO DIFF WBC: CPT

## 2022-06-25 PROCEDURE — 84439 ASSAY OF FREE THYROXINE: CPT

## 2022-06-25 PROCEDURE — 36415 COLL VENOUS BLD VENIPUNCTURE: CPT

## 2022-06-25 PROCEDURE — 80053 COMPREHEN METABOLIC PANEL: CPT

## 2022-06-25 PROCEDURE — 84153 ASSAY OF PSA TOTAL: CPT

## 2022-09-12 NOTE — PROCEDURES
PATIENT STATES THAT THE LATUDA IS MAKING HER SICK EVEN IF SHE EATS FIRST AND SHE CAN'T TAKE IT SHES TO SICK ANYTHING ELSE YOU  CAN GIVE?   PROCEDURE NOTE:  right knee ASPIRATION/WITH CORTICOSTEROID  injection  Consent was obtained, using sterile technique the knee was prepped  Supra-lateral patellar approach.   18 G needle for aspiration of 15 cc of straw colored fluid and the needle withdrawn.    Using the same port 5 cc marcaine and 40 mg kenalog injected into the knee joint  The procedure was well tolerated.    Watch for fever, or increased swelling or persistent pain in knee. Call or return to clinic prn if such symptoms occur or the knee fails to improve as anticipated.

## 2023-05-09 ENCOUNTER — OFFICE VISIT (OUTPATIENT)
Dept: URGENT CARE | Facility: CLINIC | Age: 53
End: 2023-05-09
Payer: COMMERCIAL

## 2023-05-09 VITALS
WEIGHT: 246 LBS | TEMPERATURE: 98 F | HEART RATE: 80 BPM | BODY MASS INDEX: 39.53 KG/M2 | RESPIRATION RATE: 20 BRPM | OXYGEN SATURATION: 98 % | DIASTOLIC BLOOD PRESSURE: 84 MMHG | SYSTOLIC BLOOD PRESSURE: 122 MMHG | HEIGHT: 66 IN

## 2023-05-09 DIAGNOSIS — M25.561 CHRONIC PAIN OF RIGHT KNEE: ICD-10-CM

## 2023-05-09 DIAGNOSIS — G89.29 CHRONIC PAIN OF RIGHT KNEE: ICD-10-CM

## 2023-05-09 PROCEDURE — 99214 OFFICE O/P EST MOD 30 MIN: CPT | Performed by: PHYSICIAN ASSISTANT

## 2023-05-09 RX ORDER — MELOXICAM 15 MG/1
15 TABLET ORAL DAILY
Qty: 30 TABLET | Refills: 0 | Status: SHIPPED | OUTPATIENT
Start: 2023-05-09 | End: 2023-12-04

## 2023-05-09 ASSESSMENT — ENCOUNTER SYMPTOMS
FEVER: 0
TINGLING: 0
MYALGIAS: 1
CHILLS: 0
WEAKNESS: 0
FALLS: 0

## 2023-05-09 ASSESSMENT — FIBROSIS 4 INDEX: FIB4 SCORE: 1.17

## 2023-07-17 PROBLEM — M17.9 OSTEOARTHRITIS, KNEE: Status: ACTIVE | Noted: 2023-07-17

## 2023-09-26 ENCOUNTER — HOSPITAL ENCOUNTER (OUTPATIENT)
Dept: RADIOLOGY | Facility: MEDICAL CENTER | Age: 53
End: 2023-09-26
Attending: STUDENT IN AN ORGANIZED HEALTH CARE EDUCATION/TRAINING PROGRAM
Payer: COMMERCIAL

## 2023-09-26 DIAGNOSIS — M25.561 CHRONIC PAIN OF RIGHT KNEE: ICD-10-CM

## 2023-09-26 DIAGNOSIS — M17.11 PRIMARY OSTEOARTHRITIS OF RIGHT KNEE: ICD-10-CM

## 2023-09-26 DIAGNOSIS — G89.29 CHRONIC PAIN OF RIGHT KNEE: ICD-10-CM

## 2023-09-26 PROCEDURE — 73700 CT LOWER EXTREMITY W/O DYE: CPT | Mod: RT

## 2023-09-29 PROBLEM — M17.11 PRIMARY OSTEOARTHRITIS OF RIGHT KNEE: Status: ACTIVE | Noted: 2023-07-17

## 2024-02-26 ENCOUNTER — OCCUPATIONAL MEDICINE (OUTPATIENT)
Dept: URGENT CARE | Facility: CLINIC | Age: 54
End: 2024-02-26
Payer: COMMERCIAL

## 2024-02-26 ENCOUNTER — NON-PROVIDER VISIT (OUTPATIENT)
Dept: URGENT CARE | Facility: CLINIC | Age: 54
End: 2024-02-26
Payer: COMMERCIAL

## 2024-02-26 ENCOUNTER — APPOINTMENT (OUTPATIENT)
Dept: URGENT CARE | Facility: CLINIC | Age: 54
End: 2024-02-26
Payer: COMMERCIAL

## 2024-02-26 ENCOUNTER — APPOINTMENT (OUTPATIENT)
Dept: RADIOLOGY | Facility: IMAGING CENTER | Age: 54
End: 2024-02-26
Attending: NURSE PRACTITIONER
Payer: COMMERCIAL

## 2024-02-26 VITALS
WEIGHT: 237 LBS | BODY MASS INDEX: 38.09 KG/M2 | DIASTOLIC BLOOD PRESSURE: 68 MMHG | OXYGEN SATURATION: 98 % | HEIGHT: 66 IN | RESPIRATION RATE: 16 BRPM | HEART RATE: 79 BPM | SYSTOLIC BLOOD PRESSURE: 102 MMHG | TEMPERATURE: 97.6 F

## 2024-02-26 DIAGNOSIS — M25.561 ACUTE PAIN OF RIGHT KNEE: ICD-10-CM

## 2024-02-26 DIAGNOSIS — Z02.1 PRE-EMPLOYMENT DRUG TESTING: ICD-10-CM

## 2024-02-26 DIAGNOSIS — Z02.1 PRE-EMPLOYMENT DRUG SCREENING: Primary | ICD-10-CM

## 2024-02-26 LAB
AMP AMPHETAMINE: NORMAL
BREATH ALCOHOL COMMENT: NORMAL
COC COCAINE: NORMAL
INT CON NEG: NEGATIVE
INT CON POS: POSITIVE
MET METHAMPHETAMINES: NORMAL
OPI OPIATES: NORMAL
PCP PHENCYCLIDINE: NORMAL
POC BREATHALIZER: 0 PERCENT (ref 0–0.01)
POC DRUG COMMENT 753798-OCCUPATIONAL HEALTH: NORMAL
THC: NORMAL

## 2024-02-26 PROCEDURE — 3074F SYST BP LT 130 MM HG: CPT | Performed by: NURSE PRACTITIONER

## 2024-02-26 PROCEDURE — 82075 ASSAY OF BREATH ETHANOL: CPT | Performed by: NURSE PRACTITIONER

## 2024-02-26 PROCEDURE — 99213 OFFICE O/P EST LOW 20 MIN: CPT | Performed by: NURSE PRACTITIONER

## 2024-02-26 PROCEDURE — 73562 X-RAY EXAM OF KNEE 3: CPT | Mod: TC,RT | Performed by: RADIOLOGY

## 2024-02-26 PROCEDURE — 3078F DIAST BP <80 MM HG: CPT | Performed by: NURSE PRACTITIONER

## 2024-02-26 PROCEDURE — 80305 DRUG TEST PRSMV DIR OPT OBS: CPT | Performed by: NURSE PRACTITIONER

## 2024-02-26 ASSESSMENT — FIBROSIS 4 INDEX: FIB4 SCORE: 1.19

## 2024-02-26 NOTE — LETTER
[facility] [place] [address] [facility] [address] [place] [address]  Phone:  [phone] - Fax:  [phone]   [facility] Progress Report and Disability Certification  Date of Service: 2/26/2024   No Show:  No  Date / Time of Next Visit: 3/4/2024   Claim Information   Patient Name: Christian Boo  Claim Number:     Employer: [employer] *** Date of Injury: 2/25/2024     Insurer / TPA: Lio Bashir *** ID / SSN:     Occupation:  Officer *** Diagnosis: The encounter diagnosis was Acute pain of right knee.    Medical Information   Related to Industrial Injury?   ***   Subjective Complaints:  DOI: 2/25/24: Patient is a 53-year-old male presents urgent care for evaluation of a right knee strain that occurred yesterday while he was at work.  Patient states that he was climbing up a ladder to close a door when he came down the ladder he felt a pop in his right.  He has had pain persistently in the lateral aspect of the knee with swelling.  Patient does have a history of a total knee replacement in 2014.  Has had no acute complications since 2014.  Has been resting, icing, taking Tylenol as needed for pain with minimal relief.   Objective Findings: Right knee: Skin without erythema, ecchymosis.  Edema present tenderness palpation to lateral aspect.  No gross deformities. +DROM, due to pain.  Lachman's negative, DTRs +2, gait antalgic.  Sensation intact distally, neurovascular intact   Pre-Existing Condition(s):     Assessment:   Initial Visit    Status: Additional Care Required  Permanent Disability:     Plan: Transfer Care    Diagnostics: X-ray    Comments:  I independently reviewed the patient's imaging and agree with the interpretation of the radiologist.    IMPRESSION:        1.  No radiographic evidence of acute injury.        2. Status post right total knee replacement without evidence of periprosthetic fracture or loosening.        Disability Information    Status: Released to Restricted Duty    From:  2024  Through: 3/4/2024 Restrictions are: Temporary   Physical Restrictions   Sitting:    Standing:    Stoopin hrs/day Bendin hrs/day   Squattin hrs/day Walking:    Climbin hrs/day Pushin hrs/day   Pullin hrs/day Other:    Reaching Above Shoulder (L):   Reaching Above Shoulder (R):       Reaching Below Shoulder (L):    Reaching Below Shoulder (R):      Not to exceed Weight Limits   Carrying(hrs):   Weight Limit(lb): < or = to 10 pounds Lifting(hrs):   Weight  Limit(lb): < or = to 10 pounds   Comments: X-ray negative for any acute findings.  Encouraged patient to rest, ice, Tylenol as needed for pain.  Crutches and knees brace provided.  Patiently placed on temporary work restrictions.  Follow-up in 1 week for reevaluation.    Repetitive Actions   Hands: i.e. Fine Manipulations from Grasping:     Feet: i.e. Operating Foot Controls:     Driving / Operate Machinery:     Health Care Provider’s Original or Electronic Signature  AIYANA Angulo Health Care Provider’s Original or Electronic Signature    Stepan Barnes DO MPH     Clinic Name / Location: [address] [facility]  [place] [address] Clinic Phone Number: Dept: [phone]   Appointment Time: 1:30 Pm Visit Start Time: 2:47 PM   Check-In Time:  2:23 Pm Visit Discharge Time:  ***   Original-Treating Physician or Chiropractor    Page 2-Insurer/TPA    Page 3-Employer    Page 4-Employee

## 2024-02-26 NOTE — LETTER
[facility] [place] [address] [facility] [address] [place] [address]  Phone:  [phone] - Fax:  [phone]   [facility] Progress Report and Disability Certification  Date of Service: 2/26/2024   No Show:  No  Date / Time of Next Visit: 3/4/2024   Claim Information   Patient Name: Christian Boo  Claim Number:     Employer: [employer]  Date of Injury: 2/25/2024     Insurer / TPA: Lio Bashir  ID / SSN:     Occupation:  Officer  Diagnosis: The encounter diagnosis was Acute pain of right knee.    Medical Information   Related to Industrial Injury? Yes     Subjective Complaints:  DOI: 2/25/24: Patient is a 53-year-old male presents urgent care for evaluation of a right knee strain that occurred yesterday while he was at work.  Patient states that he was climbing up a ladder to close a door when he came down the ladder he felt a pop in his right.  He has had pain persistently in the lateral aspect of the knee with swelling.  Patient does have a history of a total knee replacement in 2014.  Has had no acute complications since 2014.  Has been resting, icing, taking Tylenol as needed for pain with minimal relief.   Objective Findings: Right knee: Skin without erythema, ecchymosis.  Edema present tenderness palpation to lateral aspect.  No gross deformities. +DROM, due to pain.  Lachman's negative, DTRs +2, gait antalgic.  Sensation intact distally, neurovascular intact   Pre-Existing Condition(s):     Assessment:   Initial Visit    Status: Additional Care Required  Permanent Disability:     Plan: Transfer Care    Diagnostics: X-ray    Comments:  I independently reviewed the patient's imaging and agree with the interpretation of the radiologist.    IMPRESSION:        1.  No radiographic evidence of acute injury.        2. Status post right total knee replacement without evidence of periprosthetic fracture or loosening.        Disability Information   Status:  Released to Restricted Duty    From:  2024  Through: 3/4/2024 Restrictions are: Temporary   Physical Restrictions   Sitting:    Standing:    Stoopin hrs/day Bendin hrs/day   Squattin hrs/day Walking:    Climbin hrs/day Pushin hrs/day   Pullin hrs/day Other:    Reaching Above Shoulder (L):   Reaching Above Shoulder (R):       Reaching Below Shoulder (L):    Reaching Below Shoulder (R):      Not to exceed Weight Limits   Carrying(hrs):   Weight Limit(lb): < or = to 10 pounds Lifting(hrs):   Weight  Limit(lb): < or = to 10 pounds   Comments: X-ray negative for any acute findings.  Encouraged patient to rest, ice, Tylenol as needed for pain.  Patient has a knee brace at home and cane advised weightbearing as tolerated.  Patiently placed on temporary work restrictions.  Follow-up in 1 week for reevaluation.    Repetitive Actions   Hands: i.e. Fine Manipulations from Grasping:     Feet: i.e. Operating Foot Controls:     Driving / Operate Machinery:     Health Care Provider’s Original or Electronic Signature  AIYANA Angulo Health Care Provider’s Original or Electronic Signature    Stepan Barnes DO MPH     Clinic Name / Location: [place] [address]  [place] [address] Clinic Phone Number: Dept: [phone]   Appointment Time: 1:30 Pm Visit Start Time: 2:47 PM   Check-In Time:  2:23 Pm Visit Discharge Time:  3:42 PM   Original-Treating Physician or Chiropractor    Page 2-Insurer/TPA    Page 3-Employer    Page 4-Employee

## 2024-02-26 NOTE — LETTER
"    EMPLOYEE’S CLAIM FOR COMPENSATION/ REPORT OF INITIAL TREATMENT  FORM C-4  PLEASE TYPE OR PRINT    EMPLOYEE’S CLAIM - PROVIDE ALL INFORMATION REQUESTED   First Name                    LEENA Gonzales Last Name  Rajeev Birthdate                    1970                Sex  []M  []F Claim Number (Insurer’s Use Only)     Home Address  [address]  [address] Age  53 y.o. 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 (if applicable)  Walking, securing building    How did this injury or occupational disease occur? (Be specific and answer in detail. 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  N/A Witnesses to the Accident (if applicable)  None      Nature of Injury or " Occupational Disease  Strain  Part(s) of Body Injured or Affected  Knee (R)      I CERTIFY THAT THE ABOVE IS TRUE AND CORRECT TO T HE BEST OF MY KNOWLEDGE AND THAT I HAVE PROVIDED THIS INFORMATION IN ORDER TO OBTAIN THE BENEFITS OF [place], OR CHAPTER 617 OF [place]).  I HEREBY AUTHORIZE ANY PHYSICIAN, CHIROPRACTOR, SURGEON, PRACTITIONER OR ANY OTHER PERSON, ANY HOSPITAL, INCLUDING [facility] OR [facility], ANY  MEDICAL SERVICE ORGANIZATION, ANY INSURANCE COMPANY, OR OTHER INSTITUTION OR ORGANIZATION TO RELEASE TO EACH OTHER, ANY MEDICAL OR OTHER INFORMATION, INCLUDING BENEFITS PAID OR PAYABLE, PERTINENT TO THIS INJURY OR DISEASE, EXCEPT INFORMATION RELATIVE TO DIAGNOSIS, TREATMENT AND/OR COUNSELING FOR AIDS, PSYCHOLOGICAL CONDITIONS, ALCOHOL OR CONTROLLED SUBSTANCES, FOR WHICH I MUST GIVE SPECIFIC AUTHORIZATION.  A PHOTOSTAT OF THIS AUTHORIZATION SHALL BE VALID AS THE ORIGINAL.     Date   Place Employee’s Original or  *Electronic Signature   THIS REPORT MUST BE COMPLETED AND MAILED WITHIN 3 WORKING DAYS OF TREATMENT   Place  [facility]    Name of Facility  [facility]   Date 2/26/2024 Diagnosis and Description of Injury or Occupational Disease  (M25.561) Acute pain of right knee  The encounter diagnosis was Acute pain of right knee. Is there evidence that the injured employee was under the influence of alcohol and/or another controlled substance at the time of accident?  []No  [] Yes (if yes, please explain)   Hour 2:47 PM  No   Treatment: X-ray negative for any acute findings.  Encouraged patient to rest, ice, Tylenol as needed for pain.  Crutches and knees brace provided.  Patiently placed on temporary work restrictions.  Follow-up in 1 week for reevaluation.    Have you advised the patient to remain off work five days or more?   [] Yes Indicate dates: From   To    []No If no, is the injured employee  capable of: [] full duty [] modified duty                                                             No  Yes  If modified duty, specify any limitations / restrictions:  No bending, climbing, squatting, stooping, weight restrictions less than 10 pounds, no pushing and pulling.                                                                                                                                                                                                                                                                                                                                                                                                               X-Ray Findings: Negative    From information given by the employee, together with medical evidence, can you directly connect this injury or occupational disease as job incurred?  []Yes   [] No Yes    Is additional medical care by a physician indicated? []Yes [] No  Yes    Do you know of any previous injury or disease contributing to this condition or occupational disease? []Yes [] No (Explain if yes)                          Yes  Comments:Total knee replacement 2014   Date  2/26/2024 Print Health Care Provider’s Name  AIYANA Angulo I certify that the employer’s copy of  this form was delivered to the employer on:   Address  [address] [place] [address] INSURER'S USE ONLY                       [facility]  [id number] Provider’s Tax ID Number  [id number]   Telephone  Dept: [phone]    Health Care Provider’s Original or Electronic Signature  e-DARCI OchoaRFRANCOIS Degree (MD,DO, DC,PA-C,APRN)  {Provider Degrees:23148}  Choose (if applicable)      ORIGINAL - TREATING HEALTHCARE PROVIDER PAGE 2 - INSURER/TPA PAGE 3 - EMPLOYER PAGE 4 - EMPLOYEE             Form C-4 (rev.08/23)        BRIEF DESCRIPTION OF RIGHTS AND BENEFITS  (Pursuant to [place] 616C.050)    Notice of Injury or Occupational Disease (Incident Report Form C-1): If an  "injury or occupational disease (OD) arises out of and in the course of employment, you must provide written notice to your employer as soon as practicable, but no later than 7 days after the accident or OD. Your employer shall maintain a sufficient supply of the required forms.    Claim for Compensation (Form C-4): If medical treatment is sought, the form C-4 is available at the place of initial treatment. A completed \"Claim for Compensation\" (Form C-4) must be filed within 90 days after an accident or OD. The treating physician or chiropractor must, within 3 working days after treatment, complete and mail to the employer, the employer's insurer and third-party , the Claim for Compensation.    Medical Treatment: If you require medical treatment for your on-the-job injury or OD, you may be required to select a physician or chiropractor from a list provided by your workers’ compensation insurer, if it has contracted with an Organization for Managed Care (MCO) or Preferred Provider Organization (PPO) or providers of health care. If your employer has not entered into a contract with an MCO or PPO, you may select a physician or chiropractor from the Panel of Physicians and Chiropractors. Any medical costs related to your industrial injury or OD will be paid by your insurer.    Temporary Total Disability (TTD): If your doctor has certified that you are unable to work for a period of at least 5 consecutive days, or 5 cumulative days in a 20-day period, or places restrictions on you that your employer does not accommodate, you may be entitled to TTD compensation.    Temporary Partial Disability (TPD): If the wage you receive upon reemployment is less than the compensation for TTD to which you are entitled, the insurer may be required to pay you TPD compensation to make up the difference. TPD can only be paid for a maximum of 24 months.    Permanent Partial Disability (PPD): When your medical condition is stable " and there is an indication of a PPD as a result of your injury or OD, within 30 days, your insurer must arrange for an evaluation by a rating physician or chiropractor to determine the degree of your PPD. The amount of your PPD award depends on the date of injury, the results of the PPD evaluation, your age and wage.    Permanent Total Disability (PTD): If you are medically certified by a treating physician or chiropractor as permanently and totally disabled and have been granted a PTD status by your insurer, you are entitled to receive monthly benefits not to exceed 66 2/3% of your average monthly wage. The amount of your PTD payments is subject to reduction if you previously received a lump-sum PPD award.    Vocational Rehabilitation Services: You may be eligible for vocational rehabilitation services if you are unable to return to the job due to a permanent physical impairment or permanent restrictions as a result of your injury or occupational disease.    Transportation and Per Zachary Reimbursement: You may be eligible for travel expenses and per zachary associated with medical treatment.    Reopening: You may be able to reopen your claim if your condition worsens after claim closure.     Appeal Process: If you disagree with a written determination issued by the insurer or the insurer does not respond to your request, you may appeal to the Department of Administration, , by following the instructions contained in your determination letter. You must appeal the determination within 70 days from the date of the determination letter at [address], [address], [place], [place] [address], or [address], [place] [address]. If you disagree with the  decision, you may appeal to the Department of Administration, . You must file your appeal within 30 days from the date of the  decision letter at [address], [address],  [place], [place] [address], or [address], [address], [place], [place] [address]. If you disagree with a decision of an , you may file a petition for judicial review with the District Court. You must do so within 30 days of the Appeal Officer’s decision. You may be represented by an  at your own expense or you may contact the [facility] for possible representation.    [place]  for Inj[place] (NAIW): If you disagree with a  decision, you may request that NAIW represent you without charge at an  Hearing. For information regarding denial of benefits, you may contact the [facility] at: [address], [address], [place] [address], [phone], or [address], [address], [place] [address], [phone]    To File a Complaint with the Division: If you wish to file a complaint with the  of the Division of Industrial Relations (DIR),  please contact the Workers’ Compensation Section, [address], [address], [place], [place] [address], telephone [phone], or [address], [address], [place], [place] [address], telephone [phone].    For assistance with Workers’ Compensation Issues: You may contact the [facility] Office for Consumer Health Assistance, [address], [address] [facility], [place], [place] [address], Toll Free 1-234.789.3188, Web site: http://[facility].[place].gov/Programs/ROSALINDA E-mail: [email or web address]              __________________________________________________________________                                    _________________            Employee Name / Signature                                                                                                                            Date                                                                                                                                                                                                                               D-2 (rev. 10/20)

## 2024-02-26 NOTE — LETTER
"    EMPLOYEE’S CLAIM FOR COMPENSATION/ REPORT OF INITIAL TREATMENT  FORM C-4  PLEASE TYPE OR PRINT    EMPLOYEE’S CLAIM - PROVIDE ALL INFORMATION REQUESTED   First Name                    LEENA Gonzales Last Name  Rajeev Birthdate                    1970                Sex  []M  []F Claim Number (Insurer’s Use Only)     Home Address  [address]  [address] Age  53 y.o. 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     Date of Injury (if applicable) 2/25/2024               Hours Injury (if applicable)  2:45 PM Date Employer Notified  2/25/2024 Last Day of Work after Injury or Occupational Disease  2/26/2024 Supervisor to Whom Injury     Reported  Luis Sandoval   Address or Location of Accident (if applicable)  Work [1]   What were you doing at the time of accident? (if applicable)  Walking, securing building    How did this injury or occupational disease occur? (Be specific and answer in detail. Use additional sheet if necessary)  Went to close [facility], had to climb up ladder, upon climbing down ladder, heard a pop and felt a strain to upper part of knee (R)   If you believe that you have an occupational disease, when did you first have knowledge of the disability and its relationship to your employment?  N/A Witnesses to the Accident (if applicable)  None      Nature of Injury or " Occupational Disease  Strain  Part(s) of Body Injured or Affected  Knee (R)      I CERTIFY THAT THE ABOVE IS TRUE AND CORRECT TO T HE BEST OF MY KNOWLEDGE AND THAT I HAVE PROVIDED THIS INFORMATION IN ORDER TO OBTAIN THE [place]ENEF[place] OF [place], OR CHAPTER 617 [place]  I HEREBY AUTHORIZE ANY PHYSICIAN, CHIROPRACTOR, SURGEON, PRACTITIONER OR ANY OTHER PERSON, ANY HOSPITAL, INCLUDING [facility] OR [facility], ANY  MEDICAL SERVICE ORGANIZATION, ANY INSURANCE COMPANY, OR OTHER INSTITUTION OR ORGANIZATION TO RELEASE TO EACH OTHER, ANY MEDICAL OR OTHER INFORMATION, INCLUDING BENEFITS PAID OR PAYABLE, PERTINENT TO THIS INJURY OR DISEASE, EXCEPT INFORMATION RELATIVE TO DIAGNOSIS, TREATMENT AND/OR COUNSELING FOR AIDS, PSYCHOLOGICAL CONDITIONS, ALCOHOL OR CONTROLLED SUBSTANCES, FOR WHICH I MUST GIVE SPECIFIC AUTHORIZATION.  A PHOTOSTAT OF THIS AUTHORIZATION SHALL BE VALID AS THE ORIGINAL.     Date 2/26/24   [facility] Employee’s Original or  *Electronic Signature   THIS REPORT MUST BE COMPLETED AND MAILED WITHIN 3 WORKING DAYS OF TREATMENT   [facility]    Name of Facility  [facility]   Date 2/26/2024 Diagnosis and Description of Injury or Occupational Disease  (M25.561) Acute pain of right knee  The encounter diagnosis was Acute pain of right knee. Is there evidence that the injured employee was under the influence of alcohol and/or another controlled substance at the time of accident?  []No  [] Yes (if yes, please explain)   Hour 2:47 PM  No   Treatment: X-ray negative for any acute findings.  Encouraged patient to rest, ice, Tylenol as needed for pain.  Patient has a knee brace at home and cane advised weightbearing as tolerated.  Patiently placed on temporary work restrictions.  Follow-up in 1 week for reevaluation.    Have you advised the patient to remain off work five days or more?   [] Yes  Indicate dates: From   To    []No If no, is the injured employee capable of: [] full duty [] modified duty                                                             No  Yes  If modified duty, specify any limitations / restrictions:  No bending, climbing, squatting, stooping, weight restrictions less than 10 pounds, no pushing and pulling.                                                                                                                                                                                                                                                                                                                                                                                                               X-Ray Findings: Negative    From information given by the employee, together with medical evidence, can you directly connect this injury or occupational disease as job incurred?  []Yes   [] No Yes    Is additional medical care by a physician indicated? []Yes [] No  Yes    Do you know of any previous injury or disease contributing to this condition or occupational disease? []Yes [] No (Explain if yes)                          Yes  Comments:Total knee replacement 2014   Date  2/26/2024 Print Health Care Provider’s Name  AIYANA Angulo I certify that the employer’s copy of  this form was delivered to the employer on:   Address  [address] [place] [address] INSURER'S USE ONLY                       [facility]  [id number] Provider’s Tax ID Number  [id number]   Telephone  Dept: [phone]    Health Care Provider’s Original or Electronic Signature  e-DARCI Ochoa Degree (MD,DO, DC,PA-C,APRN)  APRN  Choose (if applicable)      ORIGINAL - TREATING HEALTHCARE PROVIDER PAGE 2 - INSURER/TPA PAGE 3 - EMPLOYER PAGE 4 - EMPLOYEE             Form C-4 (rev.08/23)        BRIEF DESCRIPTION OF RIGHTS AND BENEFITS  ([place]ursuant to [place] 616C.050)    Notice of Injury or  "Occupational Disease (Incident Report Form C-1): If an injury or occupational disease (OD) arises out of and in the course of employment, you must provide written notice to your employer as soon as practicable, but no later than 7 days after the accident or OD. Your employer shall maintain a sufficient supply of the required forms.    Claim for Compensation (Form C-4): If medical treatment is sought, the form C-4 is available at the place of initial treatment. A completed \"Claim for Compensation\" (Form C-4) must be filed within 90 days after an accident or OD. The treating physician or chiropractor must, within 3 working days after treatment, complete and mail to the employer, the employer's insurer and third-party , the Claim for Compensation.    Medical Treatment: If you require medical treatment for your on-the-job injury or OD, you may be required to select a physician or chiropractor from a list provided by your workers’ compensation insurer, if it has contracted with an Organization for Managed Care (MCO) or Preferred Provider Organization (PPO) or providers of health care. If your employer has not entered into a contract with an MCO or PPO, you may select a physician or chiropractor from the Panel of Physicians and Chiropractors. Any medical costs related to your industrial injury or OD will be paid by your insurer.    Temporary Total Disability (TTD): If your doctor has certified that you are unable to work for a period of at least 5 consecutive days, or 5 cumulative days in a 20-day period, or places restrictions on you that your employer does not accommodate, you may be entitled to TTD compensation.    Temporary Partial Disability (TPD): If the wage you receive upon reemployment is less than the compensation for TTD to which you are entitled, the insurer may be required to pay you TPD compensation to make up the difference. TPD can only be paid for a maximum of 24 months.    Permanent Partial " Disability (PPD): When your medical condition is stable and there is an indication of a PPD as a result of your injury or OD, within 30 days, your insurer must arrange for an evaluation by a rating physician or chiropractor to determine the degree of your PPD. The amount of your PPD award depends on the date of injury, the results of the PPD evaluation, your age and wage.    Permanent Total Disability (PTD): If you are medically certified by a treating physician or chiropractor as permanently and totally disabled and have been granted a PTD status by your insurer, you are entitled to receive monthly benefits not to exceed 66 2/3% of your average monthly wage. The amount of your PTD payments is subject to reduction if you previously received a lump-sum PPD award.    Vocational Rehabilitation Services: You may be eligible for vocational rehabilitation services if you are unable to return to the job due to a permanent physical impairment or permanent restrictions as a result of your injury or occupational disease.    Transportation and Per Zachary Reimbursement: You may be eligible for travel expenses and per zachary associated with medical treatment.    Reopening: You may be able to reopen your claim if your condition worsens after claim closure.     Appeal Process: If you disagree with a written determination issued by the insurer or the insurer does not respond to your request, you may appeal to the Department of Administration, , by following the instructions contained in your determination letter. You must appeal the determination within 70 days from the date of the determination letter at [address], [address], [place] [address], or [address], [place] [address]. If you disagree with the  decision, you may appeal to the Department of Administration, . You must file your appeal within 30 days from the date of the   decision letter at [address], [address], [place], [place] [address], or [address] [place], [place], [place] [address]. If you disagree with a decision of an , you may file a petition for judicial review with the District Court. You must do so within 30 days of the Appeal Officer’s decision. You may be represented by an  at your own expense or you may contact the [facility] for possible representation.    [place]  for In[place] Workers ([place]): If you disagree with a  decision, you may request that NAIW represent you without charge at an  Hearing. For information regarding denial of benefits, you may contact the [facility] at: [address], [address], [place] [address], [phone], or [address], [place] [address], [phone]    To File a Complaint with the Division: If you wish to file a complaint with the  of the Division of Industrial Relations (DIR),  please contact the Workers’ Compensation Section, [address], [address], [place], [place] [address], telephone [phone], or [address], [address], [place], [place] [address], telephone [phone].    For assistance with Workers’ Compensation Issues: You may contact the [facility] Office for Consumer Health Assistance, [address], [address], [place], [place] [address], Toll Free 1-422.926.8802, Web site: http://[facility].[place].gov/Programs/ROSALINDA E-mail: [email or web address]              __________________________________________________________________                                    _________________            Employee Name / Signature                                                                                                                            Date                                                                                                                                                                                                                               D-2 (rev. 10/20)